# Patient Record
(demographics unavailable — no encounter records)

---

## 2024-10-14 NOTE — HISTORY OF PRESENT ILLNESS
[___ Day(s)] : [unfilled] day(s) [de-identified] : Behavior changes that may or not be related to strep throat [FreeTextEntry6] : Change in sleep cycle and acting out more frequently than usual.  Minimal sore throat. No fever

## 2024-10-14 NOTE — DISCUSSION/SUMMARY
[FreeTextEntry1] : Rapid strep test, negative Throat culture submitted  Tylenol as needed for pain and /or fever.  Gargle with warm salt water.  Throat lozenges.

## 2024-10-14 NOTE — REVIEW OF SYSTEMS
[Difficulty with Sleep] : difficulty with sleep [Sore Throat] : sore throat [Negative] : Genitourinary [FreeTextEntry3] : behavioral changes

## 2025-02-03 NOTE — DISCUSSION/SUMMARY
[FreeTextEntry1] : Rapid strep test positive  5 year boy found to be rapid strep positive. Complete 10 days of antibiotics. Use antipyretics as needed. Return for follow up in 2 weeks. After being on antibiotics for at least 24 hours patient less likely to spread infection.

## 2025-03-25 NOTE — HISTORY OF PRESENT ILLNESS
[Mother] : mother [whole ___ oz/d] : consumes [unfilled] oz of whole milk per day [Fruit] : fruit [Vegetables] : vegetables [Meat] : meat [Eggs] : eggs [Fish] : fish [Dairy] : dairy [Normal] : Normal [Brushing teeth] : Brushing teeth [Yes] : Patient goes to dentist yearly [Vitamin] : Primary Fluoride Source: Vitamin [No] : Not at  exposure [Water heater temperature set at <120 degrees F] : Water heater temperature set at <120 degrees F [Car seat in back seat] : Car seat in back seat [Carbon Monoxide Detectors] : Carbon monoxide detectors [Smoke Detectors] : Smoke detectors [Supervised outdoor play] : Supervised outdoor play [Up to date] : Up to date [NO] : No [Grade ___] : Grade [unfilled] [Exposure to electronic nicotine delivery system] : No exposure to electronic nicotine delivery system

## 2025-03-25 NOTE — PHYSICAL EXAM
[Alert] : alert [No Acute Distress] : no acute distress [Normocephalic] : normocephalic [Conjunctivae with no discharge] : conjunctivae with no discharge [PERRL] : PERRL [EOMI Bilateral] : EOMI bilateral [Auricles Well Formed] : auricles well formed [Clear Tympanic membranes with present light reflex and bony landmarks] : clear tympanic membranes with present light reflex and bony landmarks [No Discharge] : no discharge [Nares Patent] : nares patent [Pink Nasal Mucosa] : pink nasal mucosa [Palate Intact] : palate intact [Nonerythematous Oropharynx] : nonerythematous oropharynx [Supple, full passive range of motion] : supple, full passive range of motion [No Palpable Masses] : no palpable masses [Symmetric Chest Rise] : symmetric chest rise [Clear to Auscultation Bilaterally] : clear to auscultation bilaterally [Regular Rate and Rhythm] : regular rate and rhythm [Normal S1, S2 present] : normal S1, S2 present [No Murmurs] : no murmurs [+2 Femoral Pulses] : +2 femoral pulses [Soft] : soft [NonTender] : non tender [Non Distended] : non distended [Normoactive Bowel Sounds] : normoactive bowel sounds [No Hepatomegaly] : no hepatomegaly [No Splenomegaly] : no splenomegaly [Testicles Descended Bilaterally] : testicles descended bilaterally [Patent] : patent [No fissures] : no fissures [No Abnormal Lymph Nodes Palpated] : no abnormal lymph nodes palpated [No Gait Asymmetry] : no gait asymmetry [No pain or deformities with palpation of bone, muscles, joints] : no pain or deformities with palpation of bone, muscles, joints [Normal Muscle Tone] : normal muscle tone [Straight] : straight [+2 Patella DTR] : +2 patella DTR [Cranial Nerves Grossly Intact] : cranial nerves grossly intact [No Rash or Lesions] : no rash or lesions [Karl: _____] : Karl [unfilled]

## 2025-07-14 NOTE — HISTORY OF PRESENT ILLNESS
[___ Day(s)] : [unfilled] day(s) [de-identified] : Sore throat, abdominal pain, vomited once  [FreeTextEntry6] : Acute onset sore throat, abdominal pain and emesis after camp bus ride. No prodromal illness or fever

## 2025-07-14 NOTE — REVIEW OF SYSTEMS
[Sore Throat] : sore throat [Vomiting] : vomiting [Diarrhea] : no diarrhea [Abdominal Pain] : abdominal pain [Negative] : Genitourinary